# Patient Record
Sex: MALE | Race: WHITE | Employment: FULL TIME | URBAN - METROPOLITAN AREA
[De-identification: names, ages, dates, MRNs, and addresses within clinical notes are randomized per-mention and may not be internally consistent; named-entity substitution may affect disease eponyms.]

---

## 2023-03-01 ENCOUNTER — OFFICE VISIT (OUTPATIENT)
Dept: URGENT CARE | Age: 48
End: 2023-03-01

## 2023-03-01 VITALS
TEMPERATURE: 98.5 F | SYSTOLIC BLOOD PRESSURE: 151 MMHG | HEART RATE: 75 BPM | HEIGHT: 71 IN | BODY MASS INDEX: 28.42 KG/M2 | DIASTOLIC BLOOD PRESSURE: 97 MMHG | WEIGHT: 203 LBS | OXYGEN SATURATION: 94 %

## 2023-03-01 DIAGNOSIS — S41.112A LACERATION OF LEFT UPPER EXTREMITY, INITIAL ENCOUNTER: Primary | ICD-10-CM

## 2023-03-01 DIAGNOSIS — R03.0 ELEVATED BLOOD PRESSURE READING: ICD-10-CM

## 2023-03-01 RX ORDER — LIDOCAINE HYDROCHLORIDE 10 MG/ML
1 INJECTION, SOLUTION EPIDURAL; INFILTRATION; INTRACAUDAL; PERINEURAL ONCE
Status: COMPLETED | OUTPATIENT
Start: 2023-03-01 | End: 2023-03-01

## 2023-03-01 RX ORDER — SULFAMETHOXAZOLE AND TRIMETHOPRIM 800; 160 MG/1; MG/1
1 TABLET ORAL 2 TIMES DAILY
Qty: 10 TABLET | Refills: 0 | Status: SHIPPED | OUTPATIENT
Start: 2023-03-01 | End: 2023-03-06

## 2023-03-01 RX ADMIN — LIDOCAINE HYDROCHLORIDE 0.5 ML: 10 INJECTION, SOLUTION EPIDURAL; INFILTRATION; INTRACAUDAL; PERINEURAL at 19:44

## 2023-03-01 ASSESSMENT — ENCOUNTER SYMPTOMS: RESPIRATORY NEGATIVE: 1

## 2023-03-01 NOTE — PROGRESS NOTES
Devi Gilman (:  1975) is a 52 y.o. male,New patient, here for evaluation of the following chief complaint(s):  Extremity Laceration (Laceration of left arm)      ASSESSMENT/PLAN:    ICD-10-CM    1. Laceration of left upper extremity, initial encounter  S41.112A Tetanus-Diphth-Acell Pertussis (BOOSTRIX) injection 0.5 mL     sulfamethoxazole-trimethoprim (BACTRIM DS;SEPTRA DS) 800-160 MG per tablet     lidocaine PF 1 % injection 1 mL      2. Elevated blood pressure reading  R03.0         Procedure:  Area irrigated with sterile NS   1% lidocaine 0.5 mL injected to wound  Successful numbing achieved  FIVE 4-0 prolene sutures to superior edge of wound  Edges well approximated  Steri strip applied to inferior aspect of wound for support  Dry dressing applied    Leave sutures in for 10-14 days  Keep clean and dry  Monitor for redness and swelling  Reviewed AVS with patient. All questions answered  Discussed elevated blood pressure. Instructed to monitor BP daily and follow up with PCP as needed. SUBJECTIVE/OBJECTIVE:  52year old male presetnts with a left arm lacertation due to arm was pinched when carrying a microwave ~ 2 hours ago. He was carrying microwave and fell backwards - his arm got caught between a wall and the microwave. He cleaned with ane septic spray from med kit. Reports minimal pain to area. He is unsure of status of his TDAP      History provided by:  Patient   used: No      Vitals:    23 1713 23 1725   BP: (!) 147/90 (!) 151/97   Site: Left Upper Arm    Pulse: 75    Temp: 98.5 °F (36.9 °C)    SpO2: 94%    Weight: 203 lb (92.1 kg)    Height: 5' 11\" (1.803 m)        Review of Systems   Constitutional: Negative. HENT: Negative. Respiratory: Negative. Cardiovascular: Negative. Skin:  Positive for wound. Linear laceration to the left posterior upper arm above elbow  No edema, bleeding has stopped   Neurological: Negative. Physical Exam  Vitals reviewed. Constitutional:       General: He is not in acute distress. Appearance: He is not ill-appearing. HENT:      Head: Normocephalic. Cardiovascular:      Rate and Rhythm: Normal rate and regular rhythm. Pulses: Normal pulses. Heart sounds: Normal heart sounds. No murmur heard. Pulmonary:      Effort: Pulmonary effort is normal.      Breath sounds: Normal breath sounds. Skin:     General: Skin is warm and dry. Neurological:      Mental Status: He is alert and oriented to person, place, and time. An electronic signature was used to authenticate this note.     --Shanda Matias, RIK - CNP

## 2023-09-13 ENCOUNTER — HOSPITAL ENCOUNTER (EMERGENCY)
Age: 48
Discharge: HOME | End: 2023-09-13
Payer: COMMERCIAL

## 2023-09-13 ENCOUNTER — APPOINTMENT (OUTPATIENT)
Dept: GENERAL RADIOLOGY | Age: 48
End: 2023-09-13
Payer: COMMERCIAL

## 2023-09-13 VITALS
OXYGEN SATURATION: 97 % | HEART RATE: 64 BPM | HEIGHT: 71 IN | BODY MASS INDEX: 28.28 KG/M2 | TEMPERATURE: 98.2 F | SYSTOLIC BLOOD PRESSURE: 148 MMHG | DIASTOLIC BLOOD PRESSURE: 86 MMHG | RESPIRATION RATE: 18 BRPM | WEIGHT: 202 LBS

## 2023-09-13 DIAGNOSIS — S66.901A INJURY OF EXTENSOR TENDON OF RIGHT HAND, INITIAL ENCOUNTER: ICD-10-CM

## 2023-09-13 DIAGNOSIS — S61.210A LACERATION OF RIGHT INDEX FINGER WITHOUT FOREIGN BODY WITHOUT DAMAGE TO NAIL, INITIAL ENCOUNTER: Primary | ICD-10-CM

## 2023-09-13 PROCEDURE — 12001 RPR S/N/AX/GEN/TRNK 2.5CM/<: CPT

## 2023-09-13 PROCEDURE — 6370000000 HC RX 637 (ALT 250 FOR IP): Performed by: NURSE PRACTITIONER

## 2023-09-13 PROCEDURE — 99283 EMERGENCY DEPT VISIT LOW MDM: CPT

## 2023-09-13 PROCEDURE — 73130 X-RAY EXAM OF HAND: CPT

## 2023-09-13 RX ORDER — CEPHALEXIN 500 MG/1
500 CAPSULE ORAL ONCE
Status: COMPLETED | OUTPATIENT
Start: 2023-09-13 | End: 2023-09-13

## 2023-09-13 RX ORDER — CEPHALEXIN 500 MG/1
500 CAPSULE ORAL 3 TIMES DAILY
Qty: 30 CAPSULE | Refills: 0 | Status: SHIPPED | OUTPATIENT
Start: 2023-09-13 | End: 2023-09-23

## 2023-09-13 RX ADMIN — CEPHALEXIN 500 MG: 500 CAPSULE ORAL at 17:33

## 2023-09-13 ASSESSMENT — PAIN - FUNCTIONAL ASSESSMENT: PAIN_FUNCTIONAL_ASSESSMENT: NONE - DENIES PAIN

## 2023-09-13 ASSESSMENT — ENCOUNTER SYMPTOMS
NAUSEA: 0
COUGH: 0
SHORTNESS OF BREATH: 0
VOMITING: 0
EYE PAIN: 0
ABDOMINAL PAIN: 0
RHINORRHEA: 0
SORE THROAT: 0
BACK PAIN: 0

## 2023-09-13 ASSESSMENT — LIFESTYLE VARIABLES
HOW MANY STANDARD DRINKS CONTAINING ALCOHOL DO YOU HAVE ON A TYPICAL DAY: PATIENT DOES NOT DRINK
HOW OFTEN DO YOU HAVE A DRINK CONTAINING ALCOHOL: NEVER

## 2023-09-13 NOTE — ED PROVIDER NOTES
[address] [address] ED  EMERGENCY DEPARTMENT ENCOUNTER        Pt Name: Silvia Haile  MRN: [id number]  [address] [facility] 1975  Date of evaluation: 9/13/2023  Provider: RIK Silva - NANCY  PCP: Yadira Cantu MD  Note Started: 2:27 PM EDT 9/13/23      MEGAN. I have evaluated this patient. CHIEF COMPLAINT       Chief Complaint   Patient presents with    Laceration     Right hand pointer finger from saw. Pt denies pain and has movement. HISTORY OF PRESENT ILLNESS: 1 or more Elements     History From: Patient    Limitations to history : None    Social Determinants Significantly Affecting Health : None    Chief Complaint: Laceration    Silvia Haile is a 50 y.o. male who presents to the emergency department today with symptoms of laceration involving the right index finger. Patient states that he was using a router when a power tool had kicked on after he had set it down and struck his right index finger. Patient has laceration to the dorsum of the finger over the proximal phalanx and MCP. Patient has extension of flexion. Denies any numbness. Bleeding is controlled. Tetanus is up-to-date per patient. He states that he had his tetanus updated 2 months ago. Nursing Notes were all reviewed and agreed with or any disagreements were addressed in the HPI. REVIEW OF SYSTEMS :      Review of Systems   Constitutional:  Negative for chills, diaphoresis and fever. HENT:  Negative for congestion, ear pain, rhinorrhea and sore throat. Eyes:  Negative for pain and visual disturbance. Respiratory:  Negative for cough and shortness of breath. Cardiovascular:  Negative for chest pain and leg swelling. Gastrointestinal:  Negative for abdominal pain, nausea and vomiting. Genitourinary:  Negative for difficulty urinating, dysuria, flank pain and frequency. Musculoskeletal:  Negative for back pain and neck pain.         Laceration of right index finger    Skin:  Negative for rash to the extensor tendon. The meantime was able to cleanse the wound irrigated well and also closed the wound with approximately 8 stitches. 5-0 Ethilon sutures. patient tolerated suture well. He denies any other acute complaints at this time. Is a block was performed with 1% lidocaine without epinephrine. No evidence of contamination on exam.  His wound was clean upon arrival.  Also x-ray was performed without any evidence of retained foreign body or acute fracture. I started him on Keflex. Patient is well-appearing. Meantime plan for discharge with Ortho. His digit was splinted in the emergency care encouraged to follow-up with hand surgery as well as Ortho. Encouraged return precautions. He verbalized understanding agrees. Disposition Considerations (tests considered but not done, Admit vs D/C, Shared Decision Making, Pt Expectation of Test or Tx.):       I am the Primary Clinician of Record. FINAL IMPRESSION      1. Laceration of right index finger without foreign body without damage to nail, initial encounter    2.  Injury of extensor tendon of right hand, initial encounter          DISPOSITION/PLAN     DISPOSITION Decision To Discharge 09/13/2023 05:15:45 PM      PATIENT REFERRED TO:  Ale Salter MD  [address] [facility] [address]  [phone]    Schedule an appointment as soon as possible for a visit       Butch Bourne MD  [facility] [address]  [phone]    Schedule an appointment as soon as possible for a visit       Lucia Hull, [address]  [phone]    Schedule an appointment as soon as possible for a visit       [facility] [place] [facility]  [address] [facility] [address] [phone]  Go to   If symptoms worsen      DISCHARGE MEDICATIONS:  Discharge Medication List as of 9/13/2023  5:29 PM        START taking these medications    Details   cephALEXin (KEFLEX) 500 MG capsule